# Patient Record
Sex: MALE | Race: WHITE | NOT HISPANIC OR LATINO | Employment: FULL TIME | URBAN - METROPOLITAN AREA
[De-identification: names, ages, dates, MRNs, and addresses within clinical notes are randomized per-mention and may not be internally consistent; named-entity substitution may affect disease eponyms.]

---

## 2017-06-13 PROBLEM — K52.9 GASTROENTERITIS, ACUTE: Status: ACTIVE | Noted: 2017-06-13

## 2018-02-08 PROBLEM — R10.9 ABDOMINAL PAIN, ACUTE: Status: ACTIVE | Noted: 2018-02-08

## 2018-02-08 PROBLEM — R13.12 OROPHARYNGEAL DYSPHAGIA: Status: ACTIVE | Noted: 2018-02-08

## 2018-02-08 PROBLEM — Z23 NEED FOR VACCINATION: Status: ACTIVE | Noted: 2018-02-08

## 2018-02-08 PROBLEM — R63.5 ABNORMAL WEIGHT GAIN: Status: ACTIVE | Noted: 2018-02-08

## 2018-11-13 PROBLEM — M54.2 NECK PAIN: Status: ACTIVE | Noted: 2018-11-13

## 2018-11-13 PROBLEM — D17.0 LIPOMA OF NECK: Status: ACTIVE | Noted: 2018-11-13

## 2019-05-20 PROBLEM — M25.561 CHRONIC PAIN OF RIGHT KNEE: Status: ACTIVE | Noted: 2019-05-20

## 2019-05-20 PROBLEM — G89.29 CHRONIC PAIN OF RIGHT KNEE: Status: ACTIVE | Noted: 2019-05-20

## 2019-05-20 PROBLEM — I10 HYPERTENSION: Status: ACTIVE | Noted: 2019-05-20

## 2024-06-29 ENCOUNTER — HOSPITAL ENCOUNTER (EMERGENCY)
Facility: HOSPITAL | Age: 49
Discharge: HOME | End: 2024-06-29
Attending: STUDENT IN AN ORGANIZED HEALTH CARE EDUCATION/TRAINING PROGRAM
Payer: COMMERCIAL

## 2024-06-29 VITALS
RESPIRATION RATE: 20 BRPM | SYSTOLIC BLOOD PRESSURE: 143 MMHG | OXYGEN SATURATION: 97 % | BODY MASS INDEX: 35.29 KG/M2 | HEIGHT: 74 IN | WEIGHT: 275 LBS | DIASTOLIC BLOOD PRESSURE: 92 MMHG | HEART RATE: 65 BPM | TEMPERATURE: 98 F

## 2024-06-29 DIAGNOSIS — R51.9 NONINTRACTABLE HEADACHE, UNSPECIFIED CHRONICITY PATTERN, UNSPECIFIED HEADACHE TYPE: Primary | ICD-10-CM

## 2024-06-29 LAB
ALBUMIN SERPL-MCNC: 3.9 G/DL (ref 3.5–5)
ALBUMIN/GLOB SERPL: 1.3 RATIO (ref 1.1–2)
ALP SERPL-CCNC: 123 UNIT/L (ref 40–150)
ALT SERPL-CCNC: 32 UNIT/L (ref 0–55)
ANION GAP SERPL CALC-SCNC: 11 MEQ/L
AST SERPL-CCNC: 22 UNIT/L (ref 5–34)
BASOPHILS # BLD AUTO: 0.04 X10(3)/MCL
BASOPHILS NFR BLD AUTO: 0.6 %
BILIRUB SERPL-MCNC: 0.3 MG/DL
BUN SERPL-MCNC: 14.2 MG/DL (ref 8.9–20.6)
CALCIUM SERPL-MCNC: 8.9 MG/DL (ref 8.4–10.2)
CHLORIDE SERPL-SCNC: 108 MMOL/L (ref 98–107)
CO2 SERPL-SCNC: 22 MMOL/L (ref 22–29)
CREAT SERPL-MCNC: 1.26 MG/DL (ref 0.73–1.18)
CREAT/UREA NIT SERPL: 11
EOSINOPHIL # BLD AUTO: 0.23 X10(3)/MCL (ref 0–0.9)
EOSINOPHIL NFR BLD AUTO: 3.4 %
ERYTHROCYTE [DISTWIDTH] IN BLOOD BY AUTOMATED COUNT: 13 % (ref 11.5–17)
GFR SERPLBLD CREATININE-BSD FMLA CKD-EPI: >60 ML/MIN/1.73/M2
GLOBULIN SER-MCNC: 3.1 GM/DL (ref 2.4–3.5)
GLUCOSE SERPL-MCNC: 106 MG/DL (ref 74–100)
HCT VFR BLD AUTO: 39.7 % (ref 42–52)
HGB BLD-MCNC: 14 G/DL (ref 14–18)
IMM GRANULOCYTES # BLD AUTO: 0.01 X10(3)/MCL (ref 0–0.04)
IMM GRANULOCYTES NFR BLD AUTO: 0.1 %
LYMPHOCYTES # BLD AUTO: 1.72 X10(3)/MCL (ref 0.6–4.6)
LYMPHOCYTES NFR BLD AUTO: 25.7 %
MCH RBC QN AUTO: 31.3 PG (ref 27–31)
MCHC RBC AUTO-ENTMCNC: 35.3 G/DL (ref 33–36)
MCV RBC AUTO: 88.6 FL (ref 80–94)
MONOCYTES # BLD AUTO: 0.6 X10(3)/MCL (ref 0.1–1.3)
MONOCYTES NFR BLD AUTO: 9 %
NEUTROPHILS # BLD AUTO: 4.09 X10(3)/MCL (ref 2.1–9.2)
NEUTROPHILS NFR BLD AUTO: 61.2 %
NRBC BLD AUTO-RTO: 0 %
PLATELET # BLD AUTO: 170 X10(3)/MCL (ref 130–400)
PMV BLD AUTO: 10.3 FL (ref 7.4–10.4)
POTASSIUM SERPL-SCNC: 4.1 MMOL/L (ref 3.5–5.1)
PROT SERPL-MCNC: 7 GM/DL (ref 6.4–8.3)
RBC # BLD AUTO: 4.48 X10(6)/MCL (ref 4.7–6.1)
SODIUM SERPL-SCNC: 141 MMOL/L (ref 136–145)
TROPONIN I SERPL-MCNC: <0.01 NG/ML (ref 0–0.04)
WBC # BLD AUTO: 6.69 X10(3)/MCL (ref 4.5–11.5)

## 2024-06-29 PROCEDURE — 96374 THER/PROPH/DIAG INJ IV PUSH: CPT

## 2024-06-29 PROCEDURE — 25000003 PHARM REV CODE 250: Performed by: NURSE PRACTITIONER

## 2024-06-29 PROCEDURE — 85025 COMPLETE CBC W/AUTO DIFF WBC: CPT | Performed by: NURSE PRACTITIONER

## 2024-06-29 PROCEDURE — 99285 EMERGENCY DEPT VISIT HI MDM: CPT | Mod: 25

## 2024-06-29 PROCEDURE — 63600175 PHARM REV CODE 636 W HCPCS

## 2024-06-29 PROCEDURE — 96361 HYDRATE IV INFUSION ADD-ON: CPT

## 2024-06-29 PROCEDURE — 80053 COMPREHEN METABOLIC PANEL: CPT | Performed by: NURSE PRACTITIONER

## 2024-06-29 PROCEDURE — 96375 TX/PRO/DX INJ NEW DRUG ADDON: CPT

## 2024-06-29 PROCEDURE — 84484 ASSAY OF TROPONIN QUANT: CPT | Performed by: NURSE PRACTITIONER

## 2024-06-29 PROCEDURE — 63600175 PHARM REV CODE 636 W HCPCS: Performed by: NURSE PRACTITIONER

## 2024-06-29 RX ORDER — ONDANSETRON HYDROCHLORIDE 2 MG/ML
INJECTION, SOLUTION INTRAVENOUS
Status: COMPLETED
Start: 2024-06-29 | End: 2024-06-29

## 2024-06-29 RX ORDER — PROCHLORPERAZINE MALEATE 10 MG
10 TABLET ORAL EVERY 6 HOURS PRN
Qty: 15 TABLET | Refills: 0 | Status: SHIPPED | OUTPATIENT
Start: 2024-06-29

## 2024-06-29 RX ORDER — KETOROLAC TROMETHAMINE 30 MG/ML
30 INJECTION, SOLUTION INTRAMUSCULAR; INTRAVENOUS
Status: COMPLETED | OUTPATIENT
Start: 2024-06-29 | End: 2024-06-29

## 2024-06-29 RX ORDER — ONDANSETRON HYDROCHLORIDE 2 MG/ML
4 INJECTION, SOLUTION INTRAVENOUS
Status: COMPLETED | OUTPATIENT
Start: 2024-06-29 | End: 2024-06-29

## 2024-06-29 RX ORDER — PROCHLORPERAZINE EDISYLATE 5 MG/ML
10 INJECTION INTRAMUSCULAR; INTRAVENOUS
Status: COMPLETED | OUTPATIENT
Start: 2024-06-29 | End: 2024-06-29

## 2024-06-29 RX ORDER — DIPHENHYDRAMINE HYDROCHLORIDE 50 MG/ML
25 INJECTION INTRAMUSCULAR; INTRAVENOUS
Status: COMPLETED | OUTPATIENT
Start: 2024-06-29 | End: 2024-06-29

## 2024-06-29 RX ORDER — KETOROLAC TROMETHAMINE 30 MG/ML
30 INJECTION, SOLUTION INTRAMUSCULAR; INTRAVENOUS
Status: DISCONTINUED | OUTPATIENT
Start: 2024-06-29 | End: 2024-06-29

## 2024-06-29 RX ADMIN — KETOROLAC TROMETHAMINE 30 MG: 30 INJECTION, SOLUTION INTRAMUSCULAR at 10:06

## 2024-06-29 RX ADMIN — DIPHENHYDRAMINE HYDROCHLORIDE 25 MG: 50 INJECTION INTRAMUSCULAR; INTRAVENOUS at 10:06

## 2024-06-29 RX ADMIN — PROCHLORPERAZINE EDISYLATE 10 MG: 5 INJECTION INTRAMUSCULAR; INTRAVENOUS at 10:06

## 2024-06-29 RX ADMIN — ONDANSETRON HYDROCHLORIDE 4 MG: 2 INJECTION, SOLUTION INTRAVENOUS at 10:06

## 2024-06-29 RX ADMIN — ONDANSETRON 4 MG: 2 INJECTION INTRAMUSCULAR; INTRAVENOUS at 10:06

## 2024-06-29 RX ADMIN — SODIUM CHLORIDE 1000 ML: 9 INJECTION, SOLUTION INTRAVENOUS at 10:06

## 2024-06-30 NOTE — ED NOTES
Pt reports that he has had a ongoing headache over several weeks, pt reports treating with ibuprofen which helps, however headache returns. Pt reports taking cold shower after mowing grass, felt like he was going to pass out, and felt stabbing pains in head. Pt is AAOx 4, no apparent distress, spouse at the bedside.

## 2024-06-30 NOTE — FIRST PROVIDER EVALUATION
"Medical screening examination initiated.  I have conducted a focused provider triage encounter, findings are as follows:    Brief history of present illness:  49 year old male presents to the ER for evaluation of headache x 2 weeks, worsening today. Denies any headache history. Reports he has been taking Ibuprofen without relief, last dose yesterday. Also reports near syncopal event after mowing the lawn. No loc, no head injury. Reports he took his BP after near syncope episode and 160/100. Reports history of HTN, not on meds.    Vitals:    06/29/24 2049   BP: (!) 143/92   Pulse: 65   Resp: 20   Temp: 98.3 °F (36.8 °C)   TempSrc: Oral   SpO2: 97%   Weight: 124.7 kg (275 lb)   Height: 6' 2" (1.88 m)       Pertinent physical exam:  alert, nonlabored, ambulatory    Brief workup plan:  ct (due to new HA, >50yr, with untreated HTN), meds    Preliminary workup initiated; this workup will be continued and followed by the physician or advanced practice provider that is assigned to the patient when roomed.  "

## 2024-06-30 NOTE — ED PROVIDER NOTES
Encounter Date: 6/29/2024       History     Chief Complaint   Patient presents with    Headache     Pt C/O persistent HA X2 weeks HA worse today. Pt states taking Ibuprofen for pain. Pt states mowed grass today went to shower after while in the fpt states felt as if he was about to pass out. Pt states after shower checked /100. BP in triage 143/92. Pt states hx HTN not currently on medication.     See MDM    The history is provided by the patient. No  was used.     Review of patient's allergies indicates:   Allergen Reactions    Oxycontin [oxycodone] Nausea And Vomiting     Past Medical History:   Diagnosis Date    Heart murmur     Hypertension     history in 20's when pt was overweight     Past Surgical History:   Procedure Laterality Date    ANTERIOR CRUCIATE LIGAMENT REPAIR      left x1   right x2    KNEE ARTHROSCOPY      multiple    KNEE CARTILAGE SURGERY Bilateral     LIPOMA RESECTION N/A 12/14/2018    Procedure: EXCISION, LIPOMA of the upper back;  Surgeon: Yan Reddy MD;  Location: [facility] OR;  Service: General;  Laterality: N/A;     Family History   Problem Relation Name Age of Onset    Diabetes Mother      Cancer Mother          family hx    Hypertension Mother      Hypertension Father      Cancer Father          family hx    Cancer Sister          thyroid     Social History     Tobacco Use    Smoking status: Light Smoker     Types: Cigars    Smokeless tobacco: Never    Tobacco comments:     pt states that he has a cigar monthly   Substance Use Topics    Alcohol use: Not Currently     Alcohol/week: 1.0 standard drink of alcohol     Types: 1 Glasses of wine per week    Drug use: No     Review of Systems   Constitutional:  Negative for fever.   Respiratory:  Negative for cough and shortness of breath.    Cardiovascular:  Negative for chest pain.   Gastrointestinal:  Negative for abdominal pain.   Genitourinary:  Negative for difficulty urinating and dysuria.   Musculoskeletal:   Negative for gait problem.   Skin:  Negative for color change.   Neurological:  Positive for headaches. Negative for dizziness and speech difficulty.   Psychiatric/Behavioral:  Negative for hallucinations and suicidal ideas.    All other systems reviewed and are negative.      Physical Exam     Initial Vitals [06/29/24 2049]   BP Pulse Resp Temp SpO2   (!) 143/92 65 20 98.3 °F (36.8 °C) 97 %      MAP       --         Physical Exam    Nursing note and vitals reviewed.  Constitutional: He appears well-developed and well-nourished.   HENT:   Head: Normocephalic.   Eyes: EOM are normal.   Neck: Neck supple.   Normal range of motion.  Cardiovascular:  Normal rate, regular rhythm, normal heart sounds and intact distal pulses.           Pulmonary/Chest: Breath sounds normal.   Abdominal: Abdomen is soft. Bowel sounds are normal.   Musculoskeletal:         General: Normal range of motion.      Cervical back: Normal range of motion and neck supple.     Neurological: He is alert and oriented to person, place, and time. He has normal strength.   Skin: Skin is warm and dry. Capillary refill takes less than 2 seconds.   Psychiatric: He has a normal mood and affect. His behavior is normal. Judgment and thought content normal.         ED Course   Procedures  Labs Reviewed   COMPREHENSIVE METABOLIC PANEL - Abnormal; Notable for the following components:       Result Value    Chloride 108 (*)     Glucose 106 (*)     Creatinine 1.26 (*)     All other components within normal limits   CBC WITH DIFFERENTIAL - Abnormal; Notable for the following components:    RBC 4.48 (*)     Hct 39.7 (*)     MCH 31.3 (*)     All other components within normal limits   TROPONIN I - Normal   CBC W/ AUTO DIFFERENTIAL    Narrative:     The following orders were created for panel order CBC auto differential.  Procedure                               Abnormality         Status                     ---------                               -----------          ------                     CBC with Differential[[id number]]        Abnormal            Final result                 Please view results for these tests on the individual orders.          Imaging Results              CT Head Without Contrast (Final result)  Result time 06/29/24 21:34:23      Final result by Stevo Anderson MD (06/29/24 21:34:23)                   Impression:      No acute intracranial findings identified.      Electronically signed by: Stevo Anderson  Date:    06/29/2024  Time:    21:34               Narrative:    EXAMINATION:  CT HEAD WITHOUT CONTRAST    CLINICAL HISTORY:  Headache, new or worsening, neuro deficit (Age 19-49y);    TECHNIQUE:  Sequential axial images were performed of the brain without contrast.    Dose product length of 1138 mGycm. Automated exposure control was utilized to minimize radiation dose.    COMPARISON:  None available.    FINDINGS:  Gray-white matter differentiation is unremarkable.  There is no intracranial mass effect, midline shift, hydrocephalus or hemorrhage. There is no sulcal effacement or low attenuation changes to suggest recent large vessel territory infarction. There is no acute extra axial fluid collection.    There is complete loss of pneumatization of left maxillary sinus occupied by mucoperiosteal thickening.  Otherwise, visualized paranasal sinuses are clear without mucosal thickening, polypoidal abnormality or air-fluid levels. Mastoid air cells aeration is optimal.                                       Medications   sodium chloride 0.9% bolus 1,000 mL 1,000 mL (1,000 mLs Intravenous New Bag 6/29/24 2215)   prochlorperazine injection Soln 10 mg (10 mg Intravenous Given 6/29/24 2216)   diphenhydrAMINE injection 25 mg (25 mg Intravenous Given 6/29/24 2216)   ketorolac injection 30 mg (30 mg Intravenous Given 6/29/24 2216)   ondansetron injection 4 mg (4 mg Intravenous Given 6/29/24 2217)     Medical Decision Making  Historian:  Patient.  Patient is a  49-year-old male  that presents with headaches that has been present intermittent 2 weeks. Associated symptoms nothing. Surrounding information is nothing. Exacerbated by nothing. Relieved by nothing. Patient treatment prior to arrival none. Risk factors include none. Other history pertaining to this complaint nothing.   Assessment:  See physical exam.  DD:  Headache, space-occupying lesion, dehydration  ED Course: History was obtained.  Physical was performed.  Patient's headache did improve with treatment in the ER.  He has follow up on the 12th with the PCP to address his blood pressure. Medical or surgical consults:  None. Social determinants that affect healthcare:  None.       Amount and/or Complexity of Data Reviewed  Labs: ordered.     Details: Labs unremarkable except for mild elevation of his creatinine  Radiology:      Details: CT showed no acute findings    Risk  Prescription drug management.  Risk Details: Compazine                                      Clinical Impression:  Final diagnoses:  [R51.9] Nonintractable headache, unspecified chronicity pattern, unspecified headache type (Primary)          ED Disposition Condition    Discharge Stable          ED Prescriptions       Medication Sig Dispense Start Date End Date Auth. Provider    prochlorperazine (COMPAZINE) 10 MG tablet Take 1 tablet (10 mg total) by mouth every 6 (six) hours as needed (headache). 15 tablet 6/29/2024 -- Heri Sosa FNP          Follow-up Information       Follow up With Specialties Details Why Contact Info    Your Primary Care Provider  Call in 3 days ed follow up              Heri Sosa FNP  06/29/24 2014

## 2024-07-01 ENCOUNTER — TELEPHONE (OUTPATIENT)
Dept: INTERNAL MEDICINE | Facility: CLINIC | Age: 49
End: 2024-07-01
Payer: COMMERCIAL

## 2024-07-01 NOTE — TELEPHONE ENCOUNTER
ARE THERE ANY OUTSTANDING TASK IN PATIENT'S CHART? NP NO LAB    IS THERE ANY DOCUMENTATION OF TASK IN CHART? NO        PLEASE HAVE PATIENT BRING A FULL LIST OR ACTUAL MEDICATIONS TO THE OFFICE VISIT

## 2024-07-11 ENCOUNTER — OFFICE VISIT (OUTPATIENT)
Dept: INTERNAL MEDICINE | Facility: CLINIC | Age: 49
End: 2024-07-11
Payer: COMMERCIAL

## 2024-07-11 VITALS
WEIGHT: 274 LBS | DIASTOLIC BLOOD PRESSURE: 70 MMHG | HEART RATE: 96 BPM | HEIGHT: 74 IN | SYSTOLIC BLOOD PRESSURE: 120 MMHG | OXYGEN SATURATION: 98 % | BODY MASS INDEX: 35.16 KG/M2

## 2024-07-11 DIAGNOSIS — Z12.11 ENCOUNTER FOR SCREENING COLONOSCOPY: ICD-10-CM

## 2024-07-11 DIAGNOSIS — I10 HYPERTENSION, UNSPECIFIED TYPE: ICD-10-CM

## 2024-07-11 DIAGNOSIS — Z00.00 WELLNESS EXAMINATION: ICD-10-CM

## 2024-07-11 DIAGNOSIS — F32.A DEPRESSION, UNSPECIFIED DEPRESSION TYPE: ICD-10-CM

## 2024-07-11 PROCEDURE — 99204 OFFICE O/P NEW MOD 45 MIN: CPT | Mod: ,,, | Performed by: STUDENT IN AN ORGANIZED HEALTH CARE EDUCATION/TRAINING PROGRAM

## 2024-07-11 PROCEDURE — 3008F BODY MASS INDEX DOCD: CPT | Mod: CPTII,,, | Performed by: STUDENT IN AN ORGANIZED HEALTH CARE EDUCATION/TRAINING PROGRAM

## 2024-07-11 PROCEDURE — 3074F SYST BP LT 130 MM HG: CPT | Mod: CPTII,,, | Performed by: STUDENT IN AN ORGANIZED HEALTH CARE EDUCATION/TRAINING PROGRAM

## 2024-07-11 PROCEDURE — 1159F MED LIST DOCD IN RCRD: CPT | Mod: CPTII,,, | Performed by: STUDENT IN AN ORGANIZED HEALTH CARE EDUCATION/TRAINING PROGRAM

## 2024-07-11 PROCEDURE — 3078F DIAST BP <80 MM HG: CPT | Mod: CPTII,,, | Performed by: STUDENT IN AN ORGANIZED HEALTH CARE EDUCATION/TRAINING PROGRAM

## 2024-07-11 PROCEDURE — 1160F RVW MEDS BY RX/DR IN RCRD: CPT | Mod: CPTII,,, | Performed by: STUDENT IN AN ORGANIZED HEALTH CARE EDUCATION/TRAINING PROGRAM

## 2024-07-11 RX ORDER — BUPROPION HYDROCHLORIDE 300 MG/1
300 TABLET ORAL DAILY
COMMUNITY
Start: 2024-06-26

## 2024-07-11 RX ORDER — CARIPRAZINE 1.5 MG/1
1.5 CAPSULE, GELATIN COATED ORAL DAILY
COMMUNITY
Start: 2024-06-30

## 2024-07-15 PROBLEM — F32.A DEPRESSION: Status: ACTIVE | Noted: 2024-07-15

## 2024-07-15 PROBLEM — Z00.00 WELLNESS EXAMINATION: Status: ACTIVE | Noted: 2024-07-15

## 2024-07-15 NOTE — PROGRESS NOTES
Subjective:      Osmany Mitchell  07/15/2024  [id number]      Chief Complaint: Establish Care (NP EST MD)       HPI:  Mr Ceron is a 48 y/o male patient who is here to establish care. Patient has depression stable on medications and following with Psychiatrist. Patient presents with elevated blood pressure initially, states has noticed this at home as well. Denies SOB, chest pain. No other concerns.     Past Medical History:   Diagnosis Date    Heart murmur     Hypertension     history in 20's when pt was overweight     Past Surgical History:   Procedure Laterality Date    ANTERIOR CRUCIATE LIGAMENT REPAIR      left x1   right x2    KNEE ARTHROSCOPY      multiple    KNEE CARTILAGE SURGERY Bilateral     LIPOMA RESECTION N/A 12/14/2018    Procedure: EXCISION, LIPOMA of the upper back;  Surgeon: Yan Reddy MD;  Location: [facility] OR;  Service: General;  Laterality: N/A;     Family History   Problem Relation Name Age of Onset    Diabetes Mother      Cancer Mother          family hx    Hypertension Mother      Hypertension Father      Cancer Father          family hx    Cancer Sister          thyroid     Social History     Tobacco Use    Smoking status: Light Smoker     Types: Cigars    Smokeless tobacco: Never    Tobacco comments:     pt states that he has a cigar monthly   Substance and Sexual Activity    Alcohol use: Not Currently     Alcohol/week: 1.0 standard drink of alcohol     Types: 1 Glasses of wine per week    Drug use: No    Sexual activity: Not on file     Review of patient's allergies indicates:   Allergen Reactions    Oxycontin [oxycodone] Nausea And Vomiting       The following were reviewed at this visit: active problem list, medication list, allergies, family history, social history, and health maintenance.    Medications:    Current Outpatient Medications:     buPROPion (WELLBUTRIN XL) 300 MG 24 hr tablet, Take 300 mg by mouth once daily., Disp: , Rfl:     multivitamin (ONE DAILY MULTIVITAMIN) per tablet,  "Take 1 tablet by mouth once daily., Disp: , Rfl:     pantoprazole (PROTONIX) 40 MG tablet, TK 1 T PO QD, Disp: , Rfl: 11    prochlorperazine (COMPAZINE) 10 MG tablet, Take 1 tablet (10 mg total) by mouth every 6 (six) hours as needed (headache)., Disp: 15 tablet, Rfl: 0    VRAYLAR 1.5 mg Cap, Take 1.5 mg by mouth once daily., Disp: , Rfl:     Current Facility-Administered Medications:     dexamethasone injection 8 mg, 8 mg, Intramuscular, 1 time in Clinic/HOD, Luanne Andersen NP      Medications have been reviewed and reconciled with patient at this visit.  Barriers to medications reviewed with patient.    Adverse reactions to current medications reviewed with patient..    Over the counter medications reviewed and reconciled with patient.  Review of Systems   Constitutional:  Negative for chills, diaphoresis, fever and weight loss.   HENT:  Negative for congestion, ear discharge, sinus pain and sore throat.    Eyes:  Negative for photophobia.   Respiratory:  Negative for cough, hemoptysis and shortness of breath.    Cardiovascular:  Negative for chest pain, palpitations, claudication, leg swelling and PND.   Gastrointestinal:  Negative for constipation, diarrhea, nausea and vomiting.   Genitourinary:  Negative for dysuria, frequency and urgency.   Musculoskeletal:  Negative for back pain, joint pain, myalgias and neck pain.   Skin:  Negative for itching and rash.   Neurological:  Negative for dizziness, focal weakness and headaches.   Psychiatric/Behavioral:  Negative for depression. The patient does not have insomnia.            Objective:      Vitals:    07/11/24 0836 07/11/24 0840 07/11/24 0932   BP: 130/88 (!) 134/90 120/70   BP Location: Left arm Right arm Left arm   Pulse: 96     SpO2: 98%     Weight: 124.3 kg (274 lb)     Height: 6' 2" (1.88 m)         Physical Exam  Constitutional:       Appearance: Normal appearance.   HENT:      Head: Normocephalic and atraumatic.   Cardiovascular:      Rate and Rhythm: " Normal rate and regular rhythm.      Pulses: Normal pulses.   Pulmonary:      Effort: Pulmonary effort is normal.      Breath sounds: Normal breath sounds.   Abdominal:      General: Abdomen is flat. Bowel sounds are normal. There is no distension.      Palpations: Abdomen is soft.      Tenderness: There is no abdominal tenderness.   Musculoskeletal:         General: No tenderness. Normal range of motion.      Right lower leg: No edema.      Left lower leg: No edema.   Lymphadenopathy:      Cervical: No cervical adenopathy.   Neurological:      General: No focal deficit present.      Mental Status: He is alert and oriented to person, place, and time.               Assessment and Plan:       1. Hypertension, unspecified type  Overview:  history in 20's when pt was overweight    Assessment & Plan:  Blood pressure is elevated  Will monitor blood pressure at home and will keep log  Will follow up in 2 weeks for review       2. Depression, unspecified depression type  Assessment & Plan:  Stable  Continue Wellbutrin and Vraylar  Follows with Psychiatry       3. Wellness examination  Assessment & Plan:  Colonoscopy: due, will refer to GI  Labs: due, ordered today     Orders:  -     Comprehensive Metabolic Panel; Future; Expected date: 07/11/2024  -     TSH; Future; Expected date: 07/11/2024  -     Lipid Panel; Future; Expected date: 07/11/2024  -     Hemoglobin A1C; Future; Expected date: 07/11/2024    4. Encounter for screening colonoscopy  -     Ambulatory referral/consult to Gastroenterology; Future; Expected date: 07/11/2024            Follow up: Follow up in about 2 weeks (around 7/25/2024) for Bp follow up.

## 2024-07-15 NOTE — ASSESSMENT & PLAN NOTE
Blood pressure is elevated  Will monitor blood pressure at home and will keep log  Will follow up in 2 weeks for review

## 2024-07-18 ENCOUNTER — LAB VISIT (OUTPATIENT)
Dept: LAB | Facility: HOSPITAL | Age: 49
End: 2024-07-18
Attending: STUDENT IN AN ORGANIZED HEALTH CARE EDUCATION/TRAINING PROGRAM
Payer: COMMERCIAL

## 2024-07-18 DIAGNOSIS — Z00.00 WELLNESS EXAMINATION: ICD-10-CM

## 2024-07-18 LAB
ALBUMIN SERPL-MCNC: 3.9 G/DL (ref 3.5–5)
ALBUMIN/GLOB SERPL: 1.2 RATIO (ref 1.1–2)
ALP SERPL-CCNC: 100 UNIT/L (ref 40–150)
ALT SERPL-CCNC: 22 UNIT/L (ref 0–55)
ANION GAP SERPL CALC-SCNC: 8 MEQ/L
AST SERPL-CCNC: 16 UNIT/L (ref 5–34)
BILIRUB SERPL-MCNC: 0.5 MG/DL
BUN SERPL-MCNC: 22.6 MG/DL (ref 8.9–20.6)
CALCIUM SERPL-MCNC: 9.3 MG/DL (ref 8.4–10.2)
CHLORIDE SERPL-SCNC: 106 MMOL/L (ref 98–107)
CHOLEST SERPL-MCNC: 218 MG/DL
CHOLEST/HDLC SERPL: 4 {RATIO} (ref 0–5)
CO2 SERPL-SCNC: 26 MMOL/L (ref 22–29)
CREAT SERPL-MCNC: 1.07 MG/DL (ref 0.73–1.18)
CREAT/UREA NIT SERPL: 21
EST. AVERAGE GLUCOSE BLD GHB EST-MCNC: 108.3 MG/DL
GFR SERPLBLD CREATININE-BSD FMLA CKD-EPI: >60 ML/MIN/1.73/M2
GLOBULIN SER-MCNC: 3.2 GM/DL (ref 2.4–3.5)
GLUCOSE SERPL-MCNC: 95 MG/DL (ref 74–100)
HBA1C MFR BLD: 5.4 %
HDLC SERPL-MCNC: 51 MG/DL (ref 35–60)
LDLC SERPL CALC-MCNC: 138 MG/DL (ref 50–140)
POTASSIUM SERPL-SCNC: 3.8 MMOL/L (ref 3.5–5.1)
PROT SERPL-MCNC: 7.1 GM/DL (ref 6.4–8.3)
SODIUM SERPL-SCNC: 140 MMOL/L (ref 136–145)
TRIGL SERPL-MCNC: 144 MG/DL (ref 34–140)
TSH SERPL-ACNC: 1.48 UIU/ML (ref 0.35–4.94)
VLDLC SERPL CALC-MCNC: 29 MG/DL

## 2024-07-18 PROCEDURE — 80053 COMPREHEN METABOLIC PANEL: CPT

## 2024-07-18 PROCEDURE — 80061 LIPID PANEL: CPT

## 2024-07-18 PROCEDURE — 83036 HEMOGLOBIN GLYCOSYLATED A1C: CPT

## 2024-07-18 PROCEDURE — 84443 ASSAY THYROID STIM HORMONE: CPT

## 2024-07-18 PROCEDURE — 36415 COLL VENOUS BLD VENIPUNCTURE: CPT

## 2024-07-24 ENCOUNTER — TELEPHONE (OUTPATIENT)
Dept: INTERNAL MEDICINE | Facility: CLINIC | Age: 49
End: 2024-07-24
Payer: COMMERCIAL

## 2024-07-24 NOTE — TELEPHONE ENCOUNTER
ARE THERE ANY OUTSTANDING TASK IN PATIENT'S CHART? NO    IS THERE ANY DOCUMENTATION OF TASK IN CHART?        PLEASE HAVE PATIENT BRING A FULL LIST OR ACTUAL MEDICATIONS TO THE OFFICE VISIT

## 2024-08-01 ENCOUNTER — OFFICE VISIT (OUTPATIENT)
Dept: INTERNAL MEDICINE | Facility: CLINIC | Age: 49
End: 2024-08-01
Payer: COMMERCIAL

## 2024-08-01 VITALS
SYSTOLIC BLOOD PRESSURE: 128 MMHG | HEART RATE: 86 BPM | DIASTOLIC BLOOD PRESSURE: 88 MMHG | HEIGHT: 74 IN | OXYGEN SATURATION: 98 % | BODY MASS INDEX: 35.18 KG/M2

## 2024-08-01 DIAGNOSIS — I10 HYPERTENSION, UNSPECIFIED TYPE: Primary | ICD-10-CM

## 2024-08-01 PROCEDURE — 1159F MED LIST DOCD IN RCRD: CPT | Mod: CPTII,,, | Performed by: STUDENT IN AN ORGANIZED HEALTH CARE EDUCATION/TRAINING PROGRAM

## 2024-08-01 PROCEDURE — 3074F SYST BP LT 130 MM HG: CPT | Mod: CPTII,,, | Performed by: STUDENT IN AN ORGANIZED HEALTH CARE EDUCATION/TRAINING PROGRAM

## 2024-08-01 PROCEDURE — 99213 OFFICE O/P EST LOW 20 MIN: CPT | Mod: ,,, | Performed by: STUDENT IN AN ORGANIZED HEALTH CARE EDUCATION/TRAINING PROGRAM

## 2024-08-01 PROCEDURE — 3044F HG A1C LEVEL LT 7.0%: CPT | Mod: CPTII,,, | Performed by: STUDENT IN AN ORGANIZED HEALTH CARE EDUCATION/TRAINING PROGRAM

## 2024-08-01 PROCEDURE — 1160F RVW MEDS BY RX/DR IN RCRD: CPT | Mod: CPTII,,, | Performed by: STUDENT IN AN ORGANIZED HEALTH CARE EDUCATION/TRAINING PROGRAM

## 2024-08-01 PROCEDURE — 3008F BODY MASS INDEX DOCD: CPT | Mod: CPTII,,, | Performed by: STUDENT IN AN ORGANIZED HEALTH CARE EDUCATION/TRAINING PROGRAM

## 2024-08-01 PROCEDURE — 3079F DIAST BP 80-89 MM HG: CPT | Mod: CPTII,,, | Performed by: STUDENT IN AN ORGANIZED HEALTH CARE EDUCATION/TRAINING PROGRAM

## 2024-10-14 PROBLEM — Z00.00 WELLNESS EXAMINATION: Status: RESOLVED | Noted: 2024-07-15 | Resolved: 2024-10-14

## 2024-10-22 ENCOUNTER — TELEPHONE (OUTPATIENT)
Dept: INTERNAL MEDICINE | Facility: CLINIC | Age: 49
End: 2024-10-22
Payer: COMMERCIAL

## 2024-10-22 NOTE — TELEPHONE ENCOUNTER
----- Message from Penny sent at 10/22/2024 11:09 AM CDT -----  Who Called: Osmany Mitchell    Does the patient already have the specialty appointment scheduled?:no  If yes, what is the date of that appointment?:  Referral to What Specialty:Cardio  Reason for Referral:Hospital advised  Does the patient want the referral with a specific physician?:yes  If yes, which provider?: [facility] [address]  Is the specialist an [facility]?:    Preferred Method of Contact: Phone Call  Patient's Preferred Phone Number on File: [phone]   Best Call Back Number, if different:  Additional Information: Pt states he checked himself into the heart [facility] last nigh and would was advised to get a referral to a Cardiologist.

## 2024-10-22 NOTE — TELEPHONE ENCOUNTER
Chest pain for few weeks, went to [facility]. Was given a name of a doctor in [place] but can not get through to that office. Do you want to see him and then send a referral or can we send referral now to [facility]?

## 2024-10-22 NOTE — TELEPHONE ENCOUNTER
Please schedule appointment for tomorrow, 10:40 or 3: 00 pm if possible and advise patient if pain does not resolve or worsens to got to ED

## 2024-10-22 NOTE — PROGRESS NOTES
Patient ID: Osmany Mitchell is a 49 y.o. male.    Chief Complaint: Chest Pain (Chest pain ongoing for about 6 weeks on and off. Started this past Sunday with pain being constant and worsening. Dull, strong pain. Having Shortness of breath of and on the past few days. Some pain noted in his left arm. )    Osmany Mitchell is a 49 y.o. male, known to Dr Arciniega, presents today for a ER follow-up visit.  Medical comorbidities include hypertension, GERD, depression.  Presents for complaints of persistent epigastric/chest discomfort.  Symptom onset >4 weeks.  Patient presented both to urgent care (10/20/2024) and ER (10/21/2024) with same complaint. EKG (10/20/2024) sinus Jeet at 52, EKG (10/21/2024) sinus Jeet at 55.  Cardiac troponin x2 (10/21/2024) within normal limits.  Also with chest x-ray (10/21/2024) negative for acute cardiopulmonary process.  Patient was discharged home from ER since stable with outpatient cardiology eval recommendation, however reports has not heard back wishing to have alternative referral.  Today presents with chest pain symptoms and perceived dyspnea on exertion.  No palpitations, orthopnea, peripheral edema, or rapid weight gain.  Denies prior history of asthma, COPD, or cardiovascular disease other than hypertension.  Does have history of reflux reporting compliance currently on pantoprazole.  No symptom association with meals.  Denies heightened anxiety/panic attacks or stressors.  No new medications or OTC supplements.            MEDICAL HISTORY:    Past Medical History:   Diagnosis Date    Depression 07/15/2024    GERD (gastroesophageal reflux disease) 10/23/2024    Heart murmur     Hypertension     history in 20's when pt was overweight      Past Surgical History:   Procedure Laterality Date    ANTERIOR CRUCIATE LIGAMENT REPAIR      left x1   right x2    KNEE ARTHROSCOPY      multiple    KNEE CARTILAGE SURGERY Bilateral     LIPOMA RESECTION N/A 12/14/2018    Procedure: EXCISION, LIPOMA  "of the upper back;  Surgeon: Yan Reddy MD;  Location: [facility] OR;  Service: General;  Laterality: N/A;      Social History     Tobacco Use    Smoking status: Light Smoker     Types: Cigars    Smokeless tobacco: Never    Tobacco comments:     pt states that he has a cigar monthly   Substance Use Topics    Alcohol use: Not Currently     Alcohol/week: 1.0 standard drink of alcohol     Types: 1 Glasses of wine per week    Drug use: No          Health Maintenance Due   Topic Date Due    Hepatitis C Screening  Never done    Pneumococcal Vaccines (Age 0-64) (1 of 2 - PCV) Never done    HIV Screening  Never done    Colorectal Cancer Screening  Never done    Influenza Vaccine (1) 09/01/2024    COVID-19 Vaccine (6 - 2024-25 season) 09/01/2024          Patient Care Team:  Tarsha Motley MD as PCP - General (Internal Medicine)      Review of Systems   Constitutional:  Negative for fatigue and fever.   HENT:  Negative for congestion, rhinorrhea, sore throat and trouble swallowing.    Eyes:  Negative for redness and visual disturbance.   Respiratory:  Positive for chest tightness and shortness of breath. Negative for apnea, cough, choking, wheezing and stridor.    Cardiovascular:  Negative for chest pain, palpitations and leg swelling.   Gastrointestinal:  Negative for abdominal pain, constipation, diarrhea, nausea and vomiting.   Genitourinary:  Negative for dysuria, flank pain, frequency and urgency.   Musculoskeletal:  Negative for arthralgias, gait problem and myalgias.   Skin:  Negative for rash and wound.   Neurological:  Negative for facial asymmetry, speech difficulty, weakness and headaches.   Psychiatric/Behavioral:  The patient is not nervous/anxious.    All other systems reviewed and are negative.      Objective:   /86 (BP Location: Right arm, Patient Position: Sitting)   Pulse 74   Ht 6' 2" (1.88 m)   Wt 121.1 kg (267 lb)   SpO2 97%   BMI 34.28 kg/m²      Physical Exam  Constitutional:       " General: He is not in acute distress.     Appearance: Normal appearance.   HENT:      Right Ear: Tympanic membrane, ear canal and external ear normal.      Left Ear: Tympanic membrane, ear canal and external ear normal.      Nose: Nose normal.      Mouth/Throat:      Mouth: Mucous membranes are moist.      Pharynx: Oropharynx is clear.   Eyes:      Extraocular Movements: Extraocular movements intact.      Conjunctiva/sclera: Conjunctivae normal.      Pupils: Pupils are equal, round, and reactive to light.   Cardiovascular:      Rate and Rhythm: Normal rate and regular rhythm.      Pulses: Normal pulses.      Heart sounds: Normal heart sounds. No murmur heard.     No gallop.   Pulmonary:      Effort: Pulmonary effort is normal.      Breath sounds: Normal breath sounds. No wheezing.   Abdominal:      General: Bowel sounds are normal. There is no distension.      Palpations: Abdomen is soft. There is no mass.      Tenderness: There is no abdominal tenderness. There is no guarding.   Musculoskeletal:         General: Normal range of motion.   Skin:     General: Skin is warm and dry.   Neurological:      Mental Status: He is alert. Mental status is at baseline.      Sensory: No sensory deficit.      Motor: No weakness.           Assessment:       ICD-10-CM ICD-9-CM   1. Acute chest pain  R07.9 786.50   2. WALKER (dyspnea on exertion)  R06.09 786.09   3. Gastroesophageal reflux disease, unspecified whether esophagitis present  K21.9 530.81        Plan:     Problem List Items Addressed This Visit          Cardiac/Vascular    Acute chest pain - Primary     -Acute   -s/p recent urgent care and ER visit  -EKG (10/20/2024) sinus Jeet at 52  -EKG (10/21/2024) sinus Jeet at 55  -troponin x2 (10/21/2024) within normal limits  -referral to Cardiology for further evaluation  -order CTA chest  -order D-dimer, repeat troponin, Mag, CK, and BNP   -history reflux; see reflux diagnosis  -denies anxiety/panic attacks   -ER precautions  discussed with patient         Relevant Medications    sucralfate (CARAFATE) 100 mg/mL suspension    Other Relevant Orders    Troponin I    Magnesium    CK    CK-MB    D-Dimer, Quantitative    CTA Chest Non-Coronary (PE Studies)    BNP    Comprehensive Metabolic Panel    Ambulatory referral/consult to Cardiology    WALKER (dyspnea on exertion)     -Acute   -s/p recent urgent care and ER visit  -EKG (10/20/2024) sinus Jeet at 52  -EKG (10/21/2024) sinus Jeet at 55  -troponin x2 (10/21/2024) within normal limits  -chest x-ray (10/21/2024) negative for acute cardiopulmonary process  -referral to Cardiology for further evaluation  -order CTA chest  -order D-dimer, repeat troponin, Mag, CK, and BNP   -denies anxiety/panic attacks   -ER precautions discussed with patient         Relevant Orders    Troponin I    CTA Chest Non-Coronary (PE Studies)    BNP    Comprehensive Metabolic Panel       GI    GERD (gastroesophageal reflux disease) (Chronic)     -reports stable   -currently on pantoprazole 40 mg daily, continue  -having epigastric discomfort, initiate trial on sucralfate x7 days considering presented to both urgent care and ER with symptoms          Relevant Medications    sucralfate (CARAFATE) 100 mg/mL suspension          Follow up for Present to ER/Urgent Care if symtoms worsen.   -plan specifics discussed above    Orders Placed This Encounter    CTA Chest Non-Coronary (PE Studies)    Troponin I    Magnesium    CK    CK-MB    D-Dimer, Quantitative    BNP    Comprehensive Metabolic Panel    Ambulatory referral/consult to Cardiology    sucralfate (CARAFATE) 100 mg/mL suspension        Medication List with Changes/Refills   New Medications    SUCRALFATE (CARAFATE) 100 MG/ML SUSPENSION    Take 10 mLs (1 g total) by mouth 4 (four) times daily before meals and nightly. for 7 days   Current Medications    BUPROPION (WELLBUTRIN XL) 300 MG 24 HR TABLET    Take 300 mg by mouth once daily.    MULTIVITAMIN (ONE DAILY  MULTIVITAMIN) PER TABLET    Take 1 tablet by mouth once daily.    PANTOPRAZOLE (PROTONIX) 40 MG TABLET    TK 1 T PO QD    PROCHLORPERAZINE (COMPAZINE) 10 MG TABLET    Take 1 tablet (10 mg total) by mouth every 6 (six) hours as needed (headache).    VRAYLAR 1.5 MG CAP    Take 1.5 mg by mouth once daily.

## 2024-10-23 ENCOUNTER — OFFICE VISIT (OUTPATIENT)
Dept: INTERNAL MEDICINE | Facility: CLINIC | Age: 49
End: 2024-10-23
Payer: COMMERCIAL

## 2024-10-23 ENCOUNTER — LAB VISIT (OUTPATIENT)
Dept: LAB | Facility: HOSPITAL | Age: 49
End: 2024-10-23
Payer: COMMERCIAL

## 2024-10-23 VITALS
HEART RATE: 74 BPM | BODY MASS INDEX: 34.27 KG/M2 | SYSTOLIC BLOOD PRESSURE: 136 MMHG | OXYGEN SATURATION: 97 % | WEIGHT: 267 LBS | DIASTOLIC BLOOD PRESSURE: 86 MMHG | HEIGHT: 74 IN

## 2024-10-23 DIAGNOSIS — R07.9 ACUTE CHEST PAIN: Primary | ICD-10-CM

## 2024-10-23 DIAGNOSIS — R06.09 DOE (DYSPNEA ON EXERTION): ICD-10-CM

## 2024-10-23 DIAGNOSIS — R07.9 ACUTE CHEST PAIN: ICD-10-CM

## 2024-10-23 DIAGNOSIS — K21.9 GASTROESOPHAGEAL REFLUX DISEASE, UNSPECIFIED WHETHER ESOPHAGITIS PRESENT: ICD-10-CM

## 2024-10-23 PROBLEM — F32.A DEPRESSION: Chronic | Status: ACTIVE | Noted: 2024-07-15

## 2024-10-23 PROBLEM — I10 HYPERTENSION: Chronic | Status: ACTIVE | Noted: 2019-05-20

## 2024-10-23 LAB
ALBUMIN SERPL-MCNC: 4.2 G/DL (ref 3.5–5)
ALBUMIN/GLOB SERPL: 1.4 RATIO (ref 1.1–2)
ALP SERPL-CCNC: 112 UNIT/L (ref 40–150)
ALT SERPL-CCNC: 26 UNIT/L (ref 0–55)
ANION GAP SERPL CALC-SCNC: 8 MEQ/L
AST SERPL-CCNC: 19 UNIT/L (ref 5–34)
BILIRUB SERPL-MCNC: 0.6 MG/DL
BNP BLD-MCNC: 14.4 PG/ML
BUN SERPL-MCNC: 18.5 MG/DL (ref 8.9–20.6)
CALCIUM SERPL-MCNC: 9.4 MG/DL (ref 8.4–10.2)
CHLORIDE SERPL-SCNC: 105 MMOL/L (ref 98–107)
CK MB SERPL-MCNC: <1 NG/ML
CK SERPL-CCNC: 75 U/L (ref 30–200)
CO2 SERPL-SCNC: 27 MMOL/L (ref 22–29)
CREAT SERPL-MCNC: 1.12 MG/DL (ref 0.72–1.25)
CREAT/UREA NIT SERPL: 17
D DIMER PPP IA.FEU-MCNC: <0.27 UG/ML FEU (ref 0–0.5)
GFR SERPLBLD CREATININE-BSD FMLA CKD-EPI: >60 ML/MIN/1.73/M2
GLOBULIN SER-MCNC: 3.1 GM/DL (ref 2.4–3.5)
GLUCOSE SERPL-MCNC: 91 MG/DL (ref 74–100)
MAGNESIUM SERPL-MCNC: 2.1 MG/DL (ref 1.6–2.6)
POTASSIUM SERPL-SCNC: 4.5 MMOL/L (ref 3.5–5.1)
PROT SERPL-MCNC: 7.3 GM/DL (ref 6.4–8.3)
SODIUM SERPL-SCNC: 140 MMOL/L (ref 136–145)
TROPONIN I SERPL-MCNC: <0.01 NG/ML (ref 0–0.04)

## 2024-10-23 PROCEDURE — 36415 COLL VENOUS BLD VENIPUNCTURE: CPT

## 2024-10-23 PROCEDURE — 82553 CREATINE MB FRACTION: CPT

## 2024-10-23 PROCEDURE — 85379 FIBRIN DEGRADATION QUANT: CPT

## 2024-10-23 PROCEDURE — 82550 ASSAY OF CK (CPK): CPT

## 2024-10-23 PROCEDURE — 3044F HG A1C LEVEL LT 7.0%: CPT | Mod: CPTII,,,

## 2024-10-23 PROCEDURE — 80053 COMPREHEN METABOLIC PANEL: CPT

## 2024-10-23 PROCEDURE — 3008F BODY MASS INDEX DOCD: CPT | Mod: CPTII,,,

## 2024-10-23 PROCEDURE — 83735 ASSAY OF MAGNESIUM: CPT

## 2024-10-23 PROCEDURE — 83880 ASSAY OF NATRIURETIC PEPTIDE: CPT

## 2024-10-23 PROCEDURE — 3075F SYST BP GE 130 - 139MM HG: CPT | Mod: CPTII,,,

## 2024-10-23 PROCEDURE — 1160F RVW MEDS BY RX/DR IN RCRD: CPT | Mod: CPTII,,,

## 2024-10-23 PROCEDURE — 84484 ASSAY OF TROPONIN QUANT: CPT

## 2024-10-23 PROCEDURE — 1159F MED LIST DOCD IN RCRD: CPT | Mod: CPTII,,,

## 2024-10-23 PROCEDURE — 99214 OFFICE O/P EST MOD 30 MIN: CPT | Mod: ,,,

## 2024-10-23 PROCEDURE — 3079F DIAST BP 80-89 MM HG: CPT | Mod: CPTII,,,

## 2024-10-23 RX ORDER — SUCRALFATE 1 G/10ML
1 SUSPENSION ORAL
Qty: 280 ML | Refills: 0 | Status: SHIPPED | OUTPATIENT
Start: 2024-10-23 | End: 2024-10-30

## 2024-10-23 NOTE — ASSESSMENT & PLAN NOTE
-Acute   -s/p recent urgent care and ER visit  -EKG (10/20/2024) sinus Jeet at 52  -EKG (10/21/2024) sinus Jeet at 55  -troponin x2 (10/21/2024) within normal limits  -chest x-ray (10/21/2024) negative for acute cardiopulmonary process  -referral to Cardiology for further evaluation  -order CTA chest  -order D-dimer, repeat troponin, Mag, CK, and BNP   -denies anxiety/panic attacks   -ER precautions discussed with patient  
-Acute   -s/p recent urgent care and ER visit  -EKG (10/20/2024) sinus Jeet at 52  -EKG (10/21/2024) sinus Jeet at 55  -troponin x2 (10/21/2024) within normal limits  -referral to Cardiology for further evaluation  -order CTA chest  -order D-dimer, repeat troponin, Mag, CK, and BNP   -history reflux; see reflux diagnosis  -denies anxiety/panic attacks   -ER precautions discussed with patient  
-reports stable   -currently on pantoprazole 40 mg daily, continue  -having epigastric discomfort, initiate trial on sucralfate x7 days considering presented to both urgent care and ER with symptoms   
abdominal pain

## 2024-10-28 ENCOUNTER — TELEPHONE (OUTPATIENT)
Dept: INTERNAL MEDICINE | Facility: CLINIC | Age: 49
End: 2024-10-28
Payer: COMMERCIAL

## 2025-03-10 ENCOUNTER — TELEPHONE (OUTPATIENT)
Dept: INTERNAL MEDICINE | Facility: CLINIC | Age: 50
End: 2025-03-10
Payer: COMMERCIAL

## 2025-03-10 DIAGNOSIS — K21.9 GASTROESOPHAGEAL REFLUX DISEASE, UNSPECIFIED WHETHER ESOPHAGITIS PRESENT: Primary | ICD-10-CM

## 2025-03-10 RX ORDER — PANTOPRAZOLE SODIUM 40 MG/1
40 TABLET, DELAYED RELEASE ORAL DAILY
Qty: 30 TABLET | Refills: 0 | Status: SHIPPED | OUTPATIENT
Start: 2025-03-10

## 2025-03-10 NOTE — TELEPHONE ENCOUNTER
----- Message from Penny sent at 3/10/2025 11:48 AM CDT -----  Who Called: Osmany MitchellRefill or New Rx:RefillRX Name and Strength:pantoprazole (PROTONIX) 40 MG tablet How is the patient currently taking it? (ex. 1XDay):Is this a 30 day or 90 day RX:Local or Mail Order:localList of preferred pharmacies on file (remove unneeded): [id number] - [place] - [address] [facility] [place]: [phone]: [phone] different Pharmacy is requested, enter Pharmacy information here including location and phone number:  Ordering Provider:Preferred Method of Contact: Phone CallPatient's Preferred Phone Number on File: [phone] Best Call Back Number, if different:Additional Information: Pt states he is completely out of medication, states provider usually refills medication is retired. Requesting refill

## 2025-04-06 DIAGNOSIS — K21.9 GASTROESOPHAGEAL REFLUX DISEASE, UNSPECIFIED WHETHER ESOPHAGITIS PRESENT: ICD-10-CM

## 2025-04-07 RX ORDER — PANTOPRAZOLE SODIUM 40 MG/1
40 TABLET, DELAYED RELEASE ORAL
Qty: 30 TABLET | Refills: 0 | Status: SHIPPED | OUTPATIENT
Start: 2025-04-07

## 2025-05-07 DIAGNOSIS — K21.9 GASTROESOPHAGEAL REFLUX DISEASE, UNSPECIFIED WHETHER ESOPHAGITIS PRESENT: ICD-10-CM

## 2025-05-07 RX ORDER — PANTOPRAZOLE SODIUM 40 MG/1
40 TABLET, DELAYED RELEASE ORAL
Qty: 30 TABLET | Refills: 3 | Status: SHIPPED | OUTPATIENT
Start: 2025-05-07

## 2025-05-19 ENCOUNTER — TELEPHONE (OUTPATIENT)
Dept: INTERNAL MEDICINE | Facility: CLINIC | Age: 50
End: 2025-05-19
Payer: COMMERCIAL

## 2025-05-27 ENCOUNTER — DOCUMENTATION ONLY (OUTPATIENT)
Dept: INTERNAL MEDICINE | Facility: CLINIC | Age: 50
End: 2025-05-27
Payer: COMMERCIAL

## 2025-05-27 ENCOUNTER — OFFICE VISIT (OUTPATIENT)
Dept: INTERNAL MEDICINE | Facility: CLINIC | Age: 50
End: 2025-05-27
Payer: COMMERCIAL

## 2025-05-27 VITALS
WEIGHT: 265 LBS | TEMPERATURE: 98 F | BODY MASS INDEX: 34.01 KG/M2 | DIASTOLIC BLOOD PRESSURE: 91 MMHG | HEIGHT: 74 IN | HEART RATE: 56 BPM | SYSTOLIC BLOOD PRESSURE: 135 MMHG | OXYGEN SATURATION: 98 %

## 2025-05-27 DIAGNOSIS — K21.9 GASTROESOPHAGEAL REFLUX DISEASE, UNSPECIFIED WHETHER ESOPHAGITIS PRESENT: Primary | ICD-10-CM

## 2025-05-27 DIAGNOSIS — F33.2 MAJOR DEPRESSIVE DISORDER, RECURRENT SEVERE WITHOUT PSYCHOTIC FEATURES: ICD-10-CM

## 2025-05-27 DIAGNOSIS — I49.3 PVC (PREMATURE VENTRICULAR CONTRACTION): ICD-10-CM

## 2025-05-27 PROCEDURE — 3008F BODY MASS INDEX DOCD: CPT | Mod: CPTII,,, | Performed by: STUDENT IN AN ORGANIZED HEALTH CARE EDUCATION/TRAINING PROGRAM

## 2025-05-27 PROCEDURE — 99214 OFFICE O/P EST MOD 30 MIN: CPT | Mod: ,,, | Performed by: STUDENT IN AN ORGANIZED HEALTH CARE EDUCATION/TRAINING PROGRAM

## 2025-05-27 PROCEDURE — 3080F DIAST BP >= 90 MM HG: CPT | Mod: CPTII,,, | Performed by: STUDENT IN AN ORGANIZED HEALTH CARE EDUCATION/TRAINING PROGRAM

## 2025-05-27 PROCEDURE — 3075F SYST BP GE 130 - 139MM HG: CPT | Mod: CPTII,,, | Performed by: STUDENT IN AN ORGANIZED HEALTH CARE EDUCATION/TRAINING PROGRAM

## 2025-05-27 PROCEDURE — 1160F RVW MEDS BY RX/DR IN RCRD: CPT | Mod: CPTII,,, | Performed by: STUDENT IN AN ORGANIZED HEALTH CARE EDUCATION/TRAINING PROGRAM

## 2025-05-27 PROCEDURE — 1159F MED LIST DOCD IN RCRD: CPT | Mod: CPTII,,, | Performed by: STUDENT IN AN ORGANIZED HEALTH CARE EDUCATION/TRAINING PROGRAM

## 2025-05-27 RX ORDER — ASPIRIN 81 MG/1
81 TABLET ORAL DAILY
COMMUNITY

## 2025-05-27 RX ORDER — RANOLAZINE 500 MG/1
500 TABLET, EXTENDED RELEASE ORAL 2 TIMES DAILY
COMMUNITY

## 2025-05-27 RX ORDER — DILTIAZEM HYDROCHLORIDE 240 MG/1
240 CAPSULE, COATED, EXTENDED RELEASE ORAL DAILY
COMMUNITY

## 2025-05-27 NOTE — PROGRESS NOTES
Subjective:      Osmany Mitchell  05/27/2025  [id number]      Chief Complaint: Gastroesophageal Reflux (Pt stated that he has been having long term acid reflux. Burning in the chest, feels like the food want to come up after eating.)       History of Present Illness    Mr Ceron presents today for follow up of acid reflux and cardiac concerns. He had an angiogram at the end of last year which revealed a cardiac bridge. He reports new onset of fluttering and irregular heartbeat in his chest, described as consistent premature ventricular contractions (PVCs). These symptoms improved during a recent mountain vacation, suggesting possible correlation with stress. He has reduced caffeine intake to one cup of coffee in the morning. He experiences acid reflux symptoms approximately once every few weeks, typically with dietary indiscretion such as consuming greasy foods. He denies any worsening of symptoms. Patient reports he used to see a gastroenterologist who would perform EGD. He is concerned about continued need for this.           Past Medical History:   Diagnosis Date    Depression 07/15/2024    GERD (gastroesophageal reflux disease) 10/23/2024    Heart murmur     Hypertension     history in 20's when pt was overweight     Past Surgical History:   Procedure Laterality Date    ANTERIOR CRUCIATE LIGAMENT REPAIR      left x1   right x2    KNEE ARTHROSCOPY      multiple    KNEE CARTILAGE SURGERY Bilateral     LIPOMA RESECTION N/A 12/14/2018    Procedure: EXCISION, LIPOMA of the upper back;  Surgeon: Yan Reddy MD;  Location: [facility];  Service: General;  Laterality: N/A;     Family History   Problem Relation Name Age of Onset    Diabetes Mother      Cancer Mother          family hx    Hypertension Mother      Hypertension Father      Cancer Father          family hx    Cancer Sister          thyroid     Social History     Tobacco Use    Smoking status: Light Smoker     Types: Cigars    Smokeless tobacco: Never    Tobacco  "comments:     pt states that he has a cigar monthly   Substance and Sexual Activity    Alcohol use: Not Currently     Alcohol/week: 1.0 standard drink of alcohol     Types: 1 Glasses of wine per week    Drug use: No    Sexual activity: Not on file     Review of patient's allergies indicates:   Allergen Reactions    Oxycontin [oxycodone] Nausea And Vomiting       The following were reviewed at this visit: active problem list, medication list, allergies, family history, social history, and health maintenance.    Medications:  Current Medications[1]      Medications have been reviewed and reconciled with patient at this visit.  Barriers to medications reviewed with patient.    Adverse reactions to current medications reviewed with patient..    Over the counter medications reviewed and reconciled with patient.  Review of Systems   Constitutional:  Negative for chills, diaphoresis, fever and weight loss.   HENT:  Negative for congestion, ear discharge, sinus pain and sore throat.    Eyes:  Negative for photophobia.   Respiratory:  Negative for cough, hemoptysis and shortness of breath.    Cardiovascular:  Negative for chest pain, palpitations, claudication, leg swelling and PND.   Gastrointestinal:  Negative for constipation, diarrhea, nausea and vomiting.   Genitourinary:  Negative for dysuria, frequency and urgency.   Musculoskeletal:  Negative for back pain, joint pain, myalgias and neck pain.   Skin:  Negative for itching and rash.   Neurological:  Negative for dizziness, focal weakness and headaches.   Psychiatric/Behavioral:  Negative for depression. The patient does not have insomnia.            Objective:      Vitals:    05/27/25 1530   BP: (!) 135/91   BP Location: Left arm   Patient Position: Sitting   Pulse: (!) 56   Temp: 98.1 °F (36.7 °C)   SpO2: 98%   Weight: 120.2 kg (265 lb)   Height: 6' 2" (1.88 m)       Physical Exam  Constitutional:       Appearance: Normal appearance.   HENT:      Head: Normocephalic " and atraumatic.   Cardiovascular:      Rate and Rhythm: Normal rate and regular rhythm.      Pulses: Normal pulses.   Pulmonary:      Effort: Pulmonary effort is normal.      Breath sounds: Normal breath sounds.   Abdominal:      General: Abdomen is flat. Bowel sounds are normal. There is no distension.      Palpations: Abdomen is soft.      Tenderness: There is no abdominal tenderness.   Musculoskeletal:         General: Normal range of motion.      Right lower leg: No edema.      Left lower leg: No edema.   Neurological:      General: No focal deficit present.      Mental Status: He is alert and oriented to person, place, and time.               Assessment and Plan:       1. Gastroesophageal reflux disease, unspecified whether esophagitis present  Assessment & Plan:  Stable  Continue pantoprazole 40 mg daily  Will refer to GI for evaluation of possible need to continue having EGD    Orders:  -     Ambulatory referral/consult to Gastroenterology; Future; Expected date: 06/03/2025    2. Major depressive disorder, recurrent severe without psychotic features  Assessment & Plan:  Stable  Continue Wellbutrin       3. PVC (premature ventricular contraction)  Assessment & Plan:  He is being evaluated by Cardiologist   Has appointment scheduled               Follow up: Follow up as scheduled or sooner if needed              [1]   Current Outpatient Medications:     aspirin (ECOTRIN) 81 MG EC tablet, Take 81 mg by mouth once daily., Disp: , Rfl:     buPROPion (WELLBUTRIN XL) 300 MG 24 hr tablet, Take 300 mg by mouth once daily., Disp: , Rfl:     diltiaZEM (CARDIZEM CD) 240 MG 24 hr capsule, Take 240 mg by mouth once daily., Disp: , Rfl:     multivitamin (ONE DAILY MULTIVITAMIN) per tablet, Take 1 tablet by mouth once daily., Disp: , Rfl:     pantoprazole (PROTONIX) 40 MG tablet, TAKE 1 TABLET BY MOUTH EVERY DAY, Disp: 30 tablet, Rfl: 3    ranolazine (RANEXA) 500 MG Tb12, Take 500 mg by mouth 2 (two) times daily., Disp: ,  Rfl:   No current facility-administered medications for this visit.

## 2025-05-27 NOTE — ASSESSMENT & PLAN NOTE
Stable  Continue pantoprazole 40 mg daily  Will refer to GI for evaluation of possible need to continue having EGD

## 2025-08-05 DIAGNOSIS — Z12.5 PROSTATE CANCER SCREENING: ICD-10-CM

## 2025-08-05 DIAGNOSIS — Z00.00 WELLNESS EXAMINATION: Primary | ICD-10-CM

## 2025-08-06 ENCOUNTER — TELEPHONE (OUTPATIENT)
Dept: INTERNAL MEDICINE | Facility: CLINIC | Age: 50
End: 2025-08-06
Payer: COMMERCIAL

## 2025-08-06 NOTE — TELEPHONE ENCOUNTER
1. Are there any outstanding tasks in the patient's chart? Yes, fasting labs    2. Is there any documentation in the chart? No

## 2025-09-02 ENCOUNTER — TELEPHONE (OUTPATIENT)
Dept: INTERNAL MEDICINE | Facility: CLINIC | Age: 50
End: 2025-09-02
Payer: COMMERCIAL